# Patient Record
Sex: FEMALE | Race: WHITE | URBAN - METROPOLITAN AREA
[De-identification: names, ages, dates, MRNs, and addresses within clinical notes are randomized per-mention and may not be internally consistent; named-entity substitution may affect disease eponyms.]

---

## 2017-09-27 ENCOUNTER — HOSPITAL ENCOUNTER (OUTPATIENT)
Dept: GENERAL RADIOLOGY | Age: 32
Discharge: HOME | End: 2017-09-27
Attending: OBSTETRICS & GYNECOLOGY
Payer: OTHER GOVERNMENT

## 2017-09-27 DIAGNOSIS — Z31.41 FERTILITY TESTING: ICD-10-CM

## 2017-09-27 PROCEDURE — 58340 CATHETER FOR HYSTEROGRAPHY: CPT

## 2017-09-27 PROCEDURE — 74011636320 HC RX REV CODE- 636/320: Performed by: OBSTETRICS & GYNECOLOGY

## 2017-09-27 RX ADMIN — IOHEXOL 10 ML: 240 INJECTION, SOLUTION INTRATHECAL; INTRAVASCULAR; INTRAVENOUS; ORAL at 13:10

## 2018-07-24 ENCOUNTER — HOSPITAL ENCOUNTER (INPATIENT)
Age: 33
LOS: 3 days | Discharge: HOME | End: 2018-07-27
Attending: OBSTETRICS & GYNECOLOGY | Admitting: OBSTETRICS & GYNECOLOGY
Payer: OTHER GOVERNMENT

## 2018-07-24 PROBLEM — O09.519 ADVANCED MATERNAL AGE, PRIMIGRAVIDA: Status: ACTIVE | Noted: 2018-07-24

## 2018-07-24 PROBLEM — B00.1 HERPES LABIALIS WITHOUT COMPLICATION: Status: ACTIVE | Noted: 2018-07-24

## 2018-07-24 PROBLEM — E03.9 HYPOTHYROIDISM IN PREGNANCY: Status: ACTIVE | Noted: 2018-07-24

## 2018-07-24 PROBLEM — O99.280 HYPOTHYROIDISM IN PREGNANCY: Status: ACTIVE | Noted: 2018-07-24

## 2018-07-24 LAB
A1 MICROGLOB PLACENTAL VAG QL: POSITIVE
ABO + RH BLD: NORMAL
ALBUMIN SERPL-MCNC: 2.7 G/DL (ref 3.4–5)
ALBUMIN/GLOB SERPL: 0.7 {RATIO} (ref 0.8–1.7)
ALP SERPL-CCNC: 151 U/L (ref 45–117)
ALT SERPL-CCNC: 19 U/L (ref 13–56)
ANION GAP SERPL CALC-SCNC: 7 MMOL/L (ref 3–18)
AST SERPL-CCNC: 18 U/L (ref 15–37)
BASOPHILS # BLD: 0 K/UL (ref 0–0.1)
BASOPHILS NFR BLD: 0 % (ref 0–2)
BILIRUB SERPL-MCNC: 0.3 MG/DL (ref 0.2–1)
BLOOD GROUP ANTIBODIES SERPL: NORMAL
BUN SERPL-MCNC: 9 MG/DL (ref 7–18)
BUN/CREAT SERPL: 11 (ref 12–20)
CALCIUM SERPL-MCNC: 9 MG/DL (ref 8.5–10.1)
CHLORIDE SERPL-SCNC: 106 MMOL/L (ref 100–108)
CO2 SERPL-SCNC: 24 MMOL/L (ref 21–32)
CONTROL LINE PRESENT?: NORMAL
CREAT SERPL-MCNC: 0.81 MG/DL (ref 0.6–1.3)
DIFFERENTIAL METHOD BLD: ABNORMAL
EOSINOPHIL # BLD: 0.1 K/UL (ref 0–0.4)
EOSINOPHIL NFR BLD: 1 % (ref 0–5)
ERYTHROCYTE [DISTWIDTH] IN BLOOD BY AUTOMATED COUNT: 12.7 % (ref 11.6–14.5)
ERYTHROCYTE [DISTWIDTH] IN BLOOD BY AUTOMATED COUNT: 12.9 % (ref 11.6–14.5)
EXPIRATION DATE: NORMAL
GLOBULIN SER CALC-MCNC: 3.9 G/DL (ref 2–4)
GLUCOSE SERPL-MCNC: 94 MG/DL (ref 74–99)
HCT VFR BLD AUTO: 33.5 % (ref 35–45)
HCT VFR BLD AUTO: 35.6 % (ref 35–45)
HGB BLD-MCNC: 11.8 G/DL (ref 12–16)
HGB BLD-MCNC: 12.4 G/DL (ref 12–16)
INTERNAL NEGATIVE CONTROL: NORMAL
KIT LOT NO.: NORMAL
LYMPHOCYTES # BLD: 1.3 K/UL (ref 0.9–3.6)
LYMPHOCYTES NFR BLD: 12 % (ref 21–52)
MCH RBC QN AUTO: 32.7 PG (ref 24–34)
MCH RBC QN AUTO: 32.7 PG (ref 24–34)
MCHC RBC AUTO-ENTMCNC: 34.8 G/DL (ref 31–37)
MCHC RBC AUTO-ENTMCNC: 35.2 G/DL (ref 31–37)
MCV RBC AUTO: 92.8 FL (ref 74–97)
MCV RBC AUTO: 93.9 FL (ref 74–97)
MONOCYTES # BLD: 0.8 K/UL (ref 0.05–1.2)
MONOCYTES NFR BLD: 8 % (ref 3–10)
NEUTS SEG # BLD: 8.5 K/UL (ref 1.8–8)
NEUTS SEG NFR BLD: 79 % (ref 40–73)
PLATELET # BLD AUTO: 216 K/UL (ref 135–420)
PLATELET # BLD AUTO: 228 K/UL (ref 135–420)
PMV BLD AUTO: 10.6 FL (ref 9.2–11.8)
PMV BLD AUTO: 10.6 FL (ref 9.2–11.8)
POTASSIUM SERPL-SCNC: 3.6 MMOL/L (ref 3.5–5.5)
PROT SERPL-MCNC: 6.6 G/DL (ref 6.4–8.2)
RBC # BLD AUTO: 3.61 M/UL (ref 4.2–5.3)
RBC # BLD AUTO: 3.79 M/UL (ref 4.2–5.3)
SODIUM SERPL-SCNC: 137 MMOL/L (ref 136–145)
SPECIMEN EXP DATE BLD: NORMAL
URATE SERPL-MCNC: 3.9 MG/DL (ref 2.6–7.2)
WBC # BLD AUTO: 10.7 K/UL (ref 4.6–13.2)
WBC # BLD AUTO: 12.6 K/UL (ref 4.6–13.2)

## 2018-07-24 PROCEDURE — 85025 COMPLETE CBC W/AUTO DIFF WBC: CPT | Performed by: OBSTETRICS & GYNECOLOGY

## 2018-07-24 PROCEDURE — 74011250636 HC RX REV CODE- 250/636: Performed by: OBSTETRICS & GYNECOLOGY

## 2018-07-24 PROCEDURE — 86900 BLOOD TYPING SEROLOGIC ABO: CPT | Performed by: OBSTETRICS & GYNECOLOGY

## 2018-07-24 PROCEDURE — 36415 COLL VENOUS BLD VENIPUNCTURE: CPT | Performed by: ADVANCED PRACTICE MIDWIFE

## 2018-07-24 PROCEDURE — 65270000029 HC RM PRIVATE

## 2018-07-24 PROCEDURE — 80053 COMPREHEN METABOLIC PANEL: CPT | Performed by: ADVANCED PRACTICE MIDWIFE

## 2018-07-24 PROCEDURE — 74011250636 HC RX REV CODE- 250/636

## 2018-07-24 PROCEDURE — 84550 ASSAY OF BLOOD/URIC ACID: CPT | Performed by: ADVANCED PRACTICE MIDWIFE

## 2018-07-24 PROCEDURE — 85027 COMPLETE CBC AUTOMATED: CPT | Performed by: ADVANCED PRACTICE MIDWIFE

## 2018-07-24 PROCEDURE — 83615 LACTATE (LD) (LDH) ENZYME: CPT | Performed by: ADVANCED PRACTICE MIDWIFE

## 2018-07-24 PROCEDURE — 84112 EVAL AMNIOTIC FLUID PROTEIN: CPT | Performed by: OBSTETRICS & GYNECOLOGY

## 2018-07-24 RX ORDER — LIDOCAINE HYDROCHLORIDE 10 MG/ML
20 INJECTION, SOLUTION EPIDURAL; INFILTRATION; INTRACAUDAL; PERINEURAL AS NEEDED
Status: DISCONTINUED | OUTPATIENT
Start: 2018-07-24 | End: 2018-07-24

## 2018-07-24 RX ORDER — MAG HYDROX/ALUMINUM HYD/SIMETH 200-200-20
15 SUSPENSION, ORAL (FINAL DOSE FORM) ORAL
Status: DISCONTINUED | OUTPATIENT
Start: 2018-07-24 | End: 2018-07-25

## 2018-07-24 RX ORDER — OXYTOCIN/RINGER'S LACTATE 20/1000 ML
500 PLASTIC BAG, INJECTION (ML) INTRAVENOUS ONCE
Status: DISCONTINUED | OUTPATIENT
Start: 2018-07-24 | End: 2018-07-24

## 2018-07-24 RX ORDER — METHYLERGONOVINE MALEATE 0.2 MG/ML
0.2 INJECTION INTRAVENOUS AS NEEDED
Status: DISCONTINUED | OUTPATIENT
Start: 2018-07-24 | End: 2018-07-25

## 2018-07-24 RX ORDER — TERBUTALINE SULFATE 1 MG/ML
0.25 INJECTION SUBCUTANEOUS
Status: DISCONTINUED | OUTPATIENT
Start: 2018-07-24 | End: 2018-07-24

## 2018-07-24 RX ORDER — NALBUPHINE HYDROCHLORIDE 10 MG/ML
10 INJECTION, SOLUTION INTRAMUSCULAR; INTRAVENOUS; SUBCUTANEOUS
Status: DISCONTINUED | OUTPATIENT
Start: 2018-07-24 | End: 2018-07-24

## 2018-07-24 RX ORDER — SODIUM CHLORIDE, SODIUM LACTATE, POTASSIUM CHLORIDE, CALCIUM CHLORIDE 600; 310; 30; 20 MG/100ML; MG/100ML; MG/100ML; MG/100ML
125 INJECTION, SOLUTION INTRAVENOUS CONTINUOUS
Status: DISCONTINUED | OUTPATIENT
Start: 2018-07-24 | End: 2018-07-25

## 2018-07-24 RX ORDER — MISOPROSTOL 200 UG/1
800 TABLET ORAL
Status: DISPENSED | OUTPATIENT
Start: 2018-07-24 | End: 2018-07-25

## 2018-07-24 RX ORDER — HYDROMORPHONE HYDROCHLORIDE 1 MG/ML
1 INJECTION, SOLUTION INTRAMUSCULAR; INTRAVENOUS; SUBCUTANEOUS
Status: DISCONTINUED | OUTPATIENT
Start: 2018-07-24 | End: 2018-07-25

## 2018-07-24 RX ORDER — SODIUM CHLORIDE, SODIUM LACTATE, POTASSIUM CHLORIDE, CALCIUM CHLORIDE 600; 310; 30; 20 MG/100ML; MG/100ML; MG/100ML; MG/100ML
125 INJECTION, SOLUTION INTRAVENOUS CONTINUOUS
Status: DISCONTINUED | OUTPATIENT
Start: 2018-07-24 | End: 2018-07-24

## 2018-07-24 RX ORDER — BUTORPHANOL TARTRATE 1 MG/ML
2 INJECTION INTRAMUSCULAR; INTRAVENOUS
Status: DISCONTINUED | OUTPATIENT
Start: 2018-07-24 | End: 2018-07-24

## 2018-07-24 RX ORDER — OXYTOCIN IN 5 % DEXTROSE 30/500 ML
2 PLASTIC BAG, INJECTION (ML) INTRAVENOUS
Status: DISCONTINUED | OUTPATIENT
Start: 2018-07-24 | End: 2018-07-25

## 2018-07-24 RX ORDER — LIDOCAINE HYDROCHLORIDE 10 MG/ML
30 INJECTION, SOLUTION EPIDURAL; INFILTRATION; INTRACAUDAL; PERINEURAL AS NEEDED
Status: DISCONTINUED | OUTPATIENT
Start: 2018-07-24 | End: 2018-07-25

## 2018-07-24 RX ORDER — OXYTOCIN IN 5 % DEXTROSE 30/500 ML
PLASTIC BAG, INJECTION (ML) INTRAVENOUS
Status: COMPLETED
Start: 2018-07-24 | End: 2018-07-24

## 2018-07-24 RX ORDER — NALBUPHINE HYDROCHLORIDE 10 MG/ML
10 INJECTION, SOLUTION INTRAMUSCULAR; INTRAVENOUS; SUBCUTANEOUS
Status: DISCONTINUED | OUTPATIENT
Start: 2018-07-24 | End: 2018-07-25

## 2018-07-24 RX ORDER — HYDROMORPHONE HYDROCHLORIDE 1 MG/ML
1 INJECTION, SOLUTION INTRAMUSCULAR; INTRAVENOUS; SUBCUTANEOUS
Status: DISCONTINUED | OUTPATIENT
Start: 2018-07-24 | End: 2018-07-24

## 2018-07-24 RX ORDER — OXYTOCIN/RINGER'S LACTATE 20/1000 ML
125 PLASTIC BAG, INJECTION (ML) INTRAVENOUS CONTINUOUS
Status: DISCONTINUED | OUTPATIENT
Start: 2018-07-24 | End: 2018-07-24

## 2018-07-24 RX ORDER — LEVOTHYROXINE SODIUM 88 UG/1
88 TABLET ORAL
Status: DISCONTINUED | OUTPATIENT
Start: 2018-07-25 | End: 2018-07-27 | Stop reason: HOSPADM

## 2018-07-24 RX ORDER — OXYTOCIN/RINGER'S LACTATE 20/1000 ML
125 PLASTIC BAG, INJECTION (ML) INTRAVENOUS CONTINUOUS
Status: DISCONTINUED | OUTPATIENT
Start: 2018-07-24 | End: 2018-07-25

## 2018-07-24 RX ORDER — LEVOTHYROXINE SODIUM 88 UG/1
88 TABLET ORAL
COMMUNITY

## 2018-07-24 RX ORDER — ONDANSETRON 2 MG/ML
4 INJECTION INTRAMUSCULAR; INTRAVENOUS
Status: DISCONTINUED | OUTPATIENT
Start: 2018-07-24 | End: 2018-07-25

## 2018-07-24 RX ORDER — OXYTOCIN/RINGER'S LACTATE 20/1000 ML
500 PLASTIC BAG, INJECTION (ML) INTRAVENOUS ONCE
Status: DISPENSED | OUTPATIENT
Start: 2018-07-24 | End: 2018-07-25

## 2018-07-24 RX ORDER — METHYLERGONOVINE MALEATE 0.2 MG/ML
0.2 INJECTION INTRAVENOUS AS NEEDED
Status: DISCONTINUED | OUTPATIENT
Start: 2018-07-24 | End: 2018-07-24

## 2018-07-24 RX ORDER — CARBOPROST TROMETHAMINE 250 UG/ML
250 INJECTION, SOLUTION INTRAMUSCULAR
Status: ACTIVE | OUTPATIENT
Start: 2018-07-24 | End: 2018-07-25

## 2018-07-24 RX ORDER — OXYTOCIN 10 [USP'U]/ML
10 INJECTION, SOLUTION INTRAMUSCULAR; INTRAVENOUS
Status: ACTIVE | OUTPATIENT
Start: 2018-07-24 | End: 2018-07-25

## 2018-07-24 RX ORDER — SALICYLIC ACID
30 POWDER (GRAM) MISCELLANEOUS ONCE
Status: DISCONTINUED | OUTPATIENT
Start: 2018-07-24 | End: 2018-07-24

## 2018-07-24 RX ORDER — MISOPROSTOL 200 UG/1
800 TABLET ORAL ONCE
Status: DISCONTINUED | OUTPATIENT
Start: 2018-07-24 | End: 2018-07-24

## 2018-07-24 RX ORDER — TERBUTALINE SULFATE 1 MG/ML
0.25 INJECTION SUBCUTANEOUS
Status: DISCONTINUED | OUTPATIENT
Start: 2018-07-24 | End: 2018-07-25

## 2018-07-24 RX ADMIN — Medication 4 MILLI-UNITS/MIN: at 16:28

## 2018-07-24 RX ADMIN — Medication 2 MILLI-UNITS/MIN: at 18:42

## 2018-07-24 RX ADMIN — Medication 6 MILLI-UNITS/MIN: at 17:22

## 2018-07-24 RX ADMIN — Medication 1 MILLI-UNITS: at 13:58

## 2018-07-24 RX ADMIN — SODIUM CHLORIDE, SODIUM LACTATE, POTASSIUM CHLORIDE, AND CALCIUM CHLORIDE 125 ML/HR: 600; 310; 30; 20 INJECTION, SOLUTION INTRAVENOUS at 22:00

## 2018-07-24 NOTE — PROGRESS NOTES
Contrx q 2-5, lasting 60 sec. FHT reactive. Afebrile. Pt comfortable sitting on birth ball. Pitocin @4, plan to increase pitocin infusion per protocol until effective contrx achieved. Pt and spouse in agreement with plan.

## 2018-07-24 NOTE — PROGRESS NOTES
SVE 1/50/-2, unchanged from this morning. Irreg contrx, q 3-10 mins. Reactive FHR. Discussed r/b/a of oxytocin for labor augmentation. Stephenie Nava is in agreement to have PIV started and pitocin. She desires unmedicated labor/birth and would like to not be offered pain mgmt.

## 2018-07-24 NOTE — PROGRESS NOTES
0854- 40 wks presented to L&D w/ c/o \"leaking fluid\" since  at 0700. Pt reports seeing clear fluid w/ some mucus and some blood. Pt denies sig uc's. Pt reports active fetal movement. RR-18 even and unlabored. 0955-Ashli RAMÍREZ at bedside. SVE explained and done now. SVE /2. POC reviewed w/ pt now for recheck at 1200. Pt requesting to ambulate in hallway. Orders received for pt to ambulate in hallway. 1015-Pt up to ambulate in hallway. 1210-Pt returned to rm [phone]. S/O at side. No distress noted. Pt states \"having some uc's w/ movement\". 1225-Wireless EFM requested by pt and placed and functioning     1505-RN at bedside. Pt sitting up on birthing ball at bedside. 1655-Ashli RAMÍREZ on unit. Orders received to continue increasing pitocin every 30 min until adequate uc's pattern. 1835-Ashli RAMÍREZ called and report given on pt breathing w/ uc's, pitocin at 8 mu/min and pt requesting to get in shower. Orders received to get pt in shower.

## 2018-07-24 NOTE — H&P
History & Physical    Name: Ashwin Brandt MRN: [id number]  SSN: [id number]    YOB: 1985  Age: 27 y.o. Sex: female        Subjective:     Ashwin Brandt   Estimated Date of Delivery: 18  OB History      Para Term  AB Living    1         SAB TAB Ectopic Molar Multiple Live Births                   Ms. Brendalyn Lesch is admitted with pregnancy at 40w0d for PROM. Prenatal course was normal. Prenatal care has been followed by [facility]. Please see prenatal records for details. Admitted to . No past medical history on file. No past surgical history on file. Social History     Occupational History    Not on file. Social History Main Topics    Smoking status: Not on file    Smokeless tobacco: Not on file    Alcohol use Not on file    Drug use: Not on file    Sexual activity: Not on file     No family history on file. Not on File  Prior to Admission medications    Medication Sig Start Date End Date Taking? Authorizing Provider   levothyroxine (SYNTHROID) 88 mcg tablet Take 88 mcg by mouth Daily (before breakfast). Yes Historical Provider        Review of Systems: See prenatal record    Objective:     Vitals:  Vitals:    18 0902   BP: 129/79   Pulse: 77   Resp: 18   Temp: 96 °F (35.6 °C)        Physical Exam:  Heart: Regular rate and rhythm or S1S2 present  Lung: clear to auscultation throughout lung fields, no wheezes, no rales, no rhonchi and normal respiratory effort  Abdomen: soft, nontender  Fundus: soft and non tender  Perineum: blood absent, amniotic fluid absent, lesions absent  Cervical Exam: 1 cm dilated    50% effaced    -2 station    P[place] Score: 6  Membranes:  Premature Rupture of Membranes; Amniotic Fluid: clear fluid  Fetal Heart Rate: Reactive    Prenatal Labs:   No results found for: RUBELLAEXT, GRBSEXT, HBSAGEXT, HIVEXT, RPREXT, GONNOEXT, CHLAMEXT    Group B Strep was negative.     Assessment/Plan:   Assessment: IUP @ 40w0d    Plan: Admit for premature rupture of membranes, reassuring maternal and fetal status. Expectant management, reassess within 18hrs of ROM, and consider augmentation if not in active labor. Risks, benefits, and alternatives discussed with pt and spouse, in agreement with plan.     Signed By:  Jessica Nur CNM     July 24, 2018

## 2018-07-24 NOTE — IP AVS SNAPSHOT
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 [facility] [phone] [facility] [phone] 
[phone] Discharge Orders None A check mallika indicates which time of day the medication should be taken. My Medications START taking these medications Instructions Each Dose to Equal  
 Morning Noon Evening Bedtime  
 ibuprofen 800 mg tablet Commonly known as:  MOTRIN Your last dose was: Your next dose is: Take 1 Tab by mouth every eight (8) hours as needed. 800 mg CONTINUE taking these medications Instructions Each Dose to Equal  
 Morning Noon Evening Bedtime  
 levothyroxine 88 mcg tablet Commonly known as:  SYNTHROID Your last dose was: Your next dose is: Take 88 mcg by mouth Daily (before breakfast). 88 mcg Where to Get Your Medications Information on where to get these meds will be given to you by the nurse or doctor. ! Ask your nurse or doctor about these medications  
  ibuprofen 800 mg tablet Discharge Instructions CONGRATULATIONS ON THE BIRTH OF YOUR BABY! The first six weeks after childbirth is a time of physical and emotional adjustment. This handout will help to answer questions and provide guidance during the postpartum period. Every family's adjustment is unique, so please call if you have further concerns. At anytime we can be reached at [phone]. During office hours please ask to speak to a charge nurse. After hours, the answering service will take a message and the Nurse-Midwife on-call will return your call. If your question can wait until office hours: Monday-Friday 8:30-4:00, please do so. For emergencies or urgent concerns do not hesitate to call us after hours. DIET Your body is in need of a well-balanced, high protein diet to recuperate from birth. Please continue to take your prenatal vitamins for 6 weeks or as long as you are breastfeeding. Continue to drink at least 6-8 cups of water or other liquid a day. A breastfeeding mother also needs extra protein, calories and calcium containing foods. It is a good rule to drink fluids with every feeding in order to maintain an adequate milk supply and avoid dehydration. Your baby will probably not be bothered by things in your diet, but if the baby seems extremely fussy or develops a rash, you may want to discuss possible food intolerances with your baby's care provider. PAIN MEDICATIONS Acetaminophen (Tylenol), ibuprofen (Motrin), or other prescribed pain medication may be taken as directed to relieve discomfort. The above medications pass in very minimal amounts into the breast milk and usually will not cause problems. There are medications that may affect the baby, so please consult your baby's care provider before taking medication.   If you are breastfeeding, be sure to mention this to any care provider you see so that medications that are safe may be selected. There is an excellent resource called Starbelly.com that is a resource for medication safety in pregnancy and lactation. You can visit their website at Exakis/ or call them toll free at [phone] if you have any questions about medication safety. UTERINE INVOLUTION / VAGINAL BLEEDING Involution is the process of the uterus returning to pre-pregnant size. It will take approximately six weeks for this process to occur. To achieve this size your uterus becomes firm to slow bleeding loss from the placental site. The first 7 days after birth, the bleeding is red and heavy. It may change with your activity and position. Some small clots are normal.   After ten days, the bleeding should be pale pink and slowed considerably. The next several weeks may progress to a pink, mucousy discharge. This may continue for 6-8 weeks, depending on your activity. During the first four weeks after delivery we recommend using sanitary pads instead of tampons. Douching should also be avoided, but it is fine to take a tub bath so long as the tub is very clean. ACTIVITY/EXERCISE Adequate rest is essential to recovery. Try to rest or sleep when the baby sleeps. After two weeks, you may begin going for short walks, doing Kegel exercises and abdominal crunches. Avoid heavy, jarring or aerobic exercises. Remember to start out slowly and build up to your previous fitness level. Use common sense and don't overdo as rest is important and the benefits of increased rest are a quicker recovery. For the first two weeks after a  try to limit trips up or down steps. Do not lift anything heavier than the baby during this time.   Lifting the baby or other objects should be done by bending at the knees rather than the waist.  Driving should be avoided during the first two to three weeks until you have the strength to push firmly on the brakes in case of an emergency. You may ride as a passenger, but DO wear a seat belt at all times. After a few weeks, you may resume normal activity at whatever pace is comfortable for you. Exercise may also be resumed gradually. Walking is a good way to start. Finally, try to be reasonable in your expectations. Caring for a new baby after major surgery can be quite trying. Arrange for assistance at home to ensure that you get enough rest.  
 
POSTPARTUM CHECK You may call the office when you return home to set up a postpartum visit. Most patients will be seen at 6 weeks after delivery, but after a  or other circumstances you may be seen in 2 weeks or less. If you are discharged from the hospital with staples that must be removed, you will be asked to come in sooner. At your postpartum visit, a pelvic exam may be performed. If you are having any problems or concerns, please do not hesitate to call. Once again our number is [phone]. MOOD CHANGES Significant hormonal changes occur in the days following delivery, and as a result, many women experience brief episodes of tearfulness or feeling \"blue. \"  These emotional swings may be made worse by lack of sleep and by the adjustments inherent in becoming a mother. For some women, these fluctuations are minor. For others, they are overwhelming; creating feelings of anxiety, depression, or the inability to cope. If you have difficulty functioning as a result of feeling down, or if the mood changes seem severe, do not improve, or result is thoughts of harming yourself or others CALL RIGHT AWAY. PERINEAL CARE The basic goals of perineal care are to prevent infection, to relieve pain and promote healing. Your stitches will dissolve in four to six weeks, and do not need to be removed. After urinating, please continue to clean with warm water from front to back.   Please continue sitz baths as instructed twice a day for a week or as needed. Call the office if you see pus in the suture site, or have unusual or severe swelling or pain that seems to be getting worse. INCISION CARE If you had a , clean and dry the incision gently as you would the rest of your body. Washing over the area with soap and water, and showering are fine. If steri-strips are present they will gradually come off with time. Tub baths are permitted. You may experience numbness and burning in the area surrounding the incision which usually resolves gradually over the next several weeks or months. RETURN OF MENSTRUATION Your first menstrual period may occur as soon as four to six weeks after your delivery if you are not breast-feeding. If breast-feeding it is more difficult to predict when your first period will occur. Even if you are not yet menstruating, you may be ovulating and it may be possible to conceive again. It is common for your first period after childbirth to be very heavy with an increased amount of cramping. BREASTS Breast-feeding Mothers: Colostrum is excreted in the first 24-72 hours. Mature breast milk will appear on the 2nd to 5th day. Engorgement may occur with the mature milk making your breasts feel warm and very full. Frequent feedings will make you more comfortable. Babies do not nurse on regular schedules. Nursing every 1 1/2 to 2 hours is normal and frequent feeding DOES NOT mean you are not making enough milk. To avoid nipple confusion, do not give bottles for the first 4 weeks. Growth spurts are common and may require more frequent feedings. This is the way baby increases your milk supply. During a growth spurt, you may feel you are feeding very frequently and that your breasts are \"empty. \"  Don't worry, your milk is produced by supply and demand so this increased frequency of feeding will increase your milk supply within 48 hours.   Sore nipples may occur with frequent feedings and are sometimes also caused by improper latch. Check for a proper latch. Baby should have a wide open mouth. Use different positions at each feeding if possible. Express a small amount of colostrum or breast milk onto the sore area and leave bra flaps unlatched until dry. The lactation consultant at [facility] is available for outpatient consultation without charge. Call [phone] from Monday-Friday 9:00am- 3:00pm to arrange an outpatient appointment with her. Local [facility] and consultants may also be very helpful. If You Are Not Breast-feeding: You will experience swelling, engorgement and some milk production. There are no safe medications available to stop lactation. Some remedies for engorgement include: wearing a tight bra, ice packs and cold green cabbage leaves placed between the breast and your bra. Change these frequently. Tylenol or Motrin should help with the discomfort. SEXUAL ADJUSTMENTS We recommend that you wait at least four weeks before resuming sexual intercourse. A sore perineum, a demanding baby and fatigue will certainly affect your ability to enjoy lovemaking! A vaginal lubricant is recommended to help with any dryness. It is very important to remember that you will ovulate BEFORE your first period and can conceive. If you do not wish another pregnancy right away, please take precautions to avoid pregnancy. If you would like a prescription method of birth control, please discuss this with us at your 6 week visit. ELIMINATION We remind all postpartum patients that it may take a few days for your bowels to return to normal, especially if you had a long labor. For those who had C-sections or severe lacerations, we recommend that you use a stool softener twice daily for at least two weeks. Many stool softeners are over-the-counter. Colace (Docusate Sodium) is recommended.   Bulk forming agents such as Metamucil or Olene Pardon may be used daily in addition to a stool softener to promote regular bowel movements. Eating fresh fruits and vegetables along with whole grains is helpful as well. Do not be afraid to have a bowel movement as your stitches will not \"come out\" in the course of having a bowel movement. Urination may be difficult due to soreness around the urethra, or as an after effect of epidural.  This is temporary and can be helped  by squirting water over the perineum or try going in the shower. Hemorrhoids are common after birth. Tucks pads, Anusol cream and avoiding constipation are helpful. If constipation does occur, you may take Milk of Magnesia or Senekot according to the package instructions. DANGER SIGNS! CALL WITHOUT DELAY IF YOU ARE EXPERIENCING ANY OF THE FOLLOWING: 
* Unusually heavy bleeding, soaking more than 1 or more pads in an hour. * Vaginal discharge with strong foul odor. * Fever of 101 or higher * Unusual pain or tenderness in the abdominal area. * If breasts are red, hot or have a painful lump. * Depression that persists longer than 1-2 weeks or is severe. * Any urinary frequency accompanied by urgency or pain. * A lump in leg or calf especially if painful, warm or red. We thank you for choosing us for your prenatal care and/or delivery. We wish you all happiness and health with your baby for his or her lifetime! Introducing [facility]! Romayne Duster introduces Wiz Maps patient portal. Now you can access parts of your medical record, email your doctor's office, and request medication refills online. 1. In your internet browser, go to [email or web address]. [email or web address] Step [email or web address] 2. Click on the First Time User? Click Here link in the Sign In box. You will see the New Member Sign Up page. 3. Enter your Wiz Maps Access Code exactly as it appears below.  You will not need to use this code after youve completed the sign-up process. If you do not sign up before the expiration date, you must request a new code. · Duo Security Access Code: [email or web address] Expires: 10/25/2018  9:16 AM 
 
4. Enter the last four digits of your Social Security Number (xxxx) and Date of Birth (mm/dd/yyyy) as indicated and click Submit. You will be taken to the next sign-up page. 5. Create a Duo Security ID. This will be your Duo Security login ID and cannot be changed, so think of one that is secure and easy to remember. 6. Create a Duo Security password. You can change your password at any time. 7. Enter your Password Reset Question and Answer. This can be used at a later time if you forget your password. 8. Enter your e-mail address. You will receive e-mail notification when new information is available in [address] 9. Click Sign Up. You can now view and download portions of your medical record. 10. Click the Download Summary menu link to download a portable copy of your medical information. If you have questions, please visit the Frequently Asked Questions section of the Duo Security website. Remember, Duo Security is NOT to be used for urgent needs. For medical emergencies, dial 911. Now available from your iPhone and Android! Introducing Ildefonso Cisneros As a [facility] patient, I wanted to make you aware of our electronic visit tool called Ildefonso Cisneros. [facility] 24/7 allows you to connect within minutes with a medical provider 24 hours a day, seven days a week via a mobile device or tablet or logging into a secure website from your computer. You can access Ildefonso Cisneros from anywhere in the [place].  
 
A virtual visit might be right for you when you have a simple condition and feel like you just dont want to get out of bed, or cant get away from work for an appointment, when your regular [facility] provider is not available (evenings, weekends or holidays), or when youre out of town and need minor care. Electronic visits cost only $49 and if the Larios Pearson Smart Mocha 24/Dryad provider determines a prescription is needed to treat your condition, one can be electronically transmitted to a nearby pharmacy*. Please take a moment to enroll today if you have not already done so. The enrollment process is free and takes just a few minutes. To enroll, please download the Datacastle alfie to your tablet or phone, or visit [email or web address] to enroll on your computer. And, as an [address] [place] [address] patient with a [email or web address] account, the results of your visits will be scanned into your electronic medical record and your primary care provider will be able to view the scanned results. We urge you to continue to see your regular [facility] provider for your ongoing medical care. And while your primary care provider may not be the one available when you seek a Yazino virtual visit, the peace of mind you get from getting a real diagnosis real time can be priceless. For more information on Yazino, view our Frequently Asked Questions (FAQs) at [email or web address]. Sincerely, 
 
Francisco Ren MD 
Chief Medical Officer [facility] Lianet Gregory *:  certain medications cannot be prescribed via Yazino Providers Seen During Your Hospitalization Provider Specialty Primary office phone Mitchell Schuster MD Obstetrics & Gynecology [phone] Your Primary Care Physician (PCP) Primary Care Physician Office Phone Office Fax OTHER, PHYS ** None ** ** None ** You are allergic to the following No active allergies Recent Documentation Breastfeeding? OB Status Unknown Recent pregnancy Emergency Contacts Name Discharge Info Relation Home Work Mobile  Lui Dueñas DISCHARGE CAREGIVER [3] Spouse [3]  [phone] Patient Belongings The following personal items are in your possession at time of discharge: 
  Dental Appliances: None  Visual Aid: None      Home Medications: Sent home   Jewelry: None  Clothing: At bedside, Dress Discharge Instructions Attachments/References DEPRESSION: POSTPARTUM (ENGLISH) BREASTFEEDING (ENGLISH) Patient Handouts Depression After Childbirth: Care Instructions Your Care Instructions Many women get the \"baby blues\" during the first few days after childbirth. You may lose sleep, feel irritable, and cry easily. You may feel happy one minute and sad the next. Hormone changes are one cause of these emotional changes. Also, the demands of a new baby, along with visits from relatives or other family needs, add to a mother's stress. The \"baby blues\" often peak around the fourth day. Then they ease up in less than 2 weeks. If your moodiness or anxiety lasts for more than 2 weeks, or if you feel like life is not worth living, you may have postpartum depression. This is different for each mother. Some mothers with serious depression may worry intensely about their infant's well-being. Others may feel distant from their child. Some mothers might even feel that they might harm their baby. A mother may have signs of paranoia, wondering if someone is watching her. Depression is not a sign of weakness. It is a medical condition that requires treatment. Medicine and counseling often work well to reduce depression. Talk to your doctor about taking antidepressant medicine while breastfeeding. Follow-up care is a key part of your treatment and safety. Be sure to make and go to all appointments, and call your doctor if you are having problems. It's also a good idea to know your test results and keep a list of the medicines you take. How do you know if you are depressed? With all the changes in your life, you may not know if you are depressed. Pregnancy sometimes causes changes in how you feel that are similar to the symptoms of depression. Symptoms of depression include: · Feeling sad or hopeless and losing interest in daily activities. These are the most common symptoms of depression. · Sleeping too much or not enough. · Feeling tired. You may feel as if you have no energy. · Eating too much or too little. · Writing or talking about death, such as writing suicide notes or talking about guns, knives, or pills. Keep the numbers for these national suicide hotlines: 5-459-329-TALK (8-470.899.2364) and 3-447-QNWUEZV (8-319.998.8662). If you or someone you know talks about suicide or feeling hopeless, get help right away. How can you care for yourself at home? · Be safe with medicines. Take your medicines exactly as prescribed. Call your doctor if you think you are having a problem with your medicine. · Eat a healthy diet so that you can keep up your energy. · Get regular daily exercise, such as walks, to help improve your mood. · Get as much sunlight as possible. Keep your shades and curtains open. Get outside as much as you can. · Avoid using alcohol or other substances to feel better. · Get as much rest and sleep as possible. Avoid doing too much. Being too tired can increase depression. · Play stimulating music throughout your day and soothing music at night. · Schedule outings and visits with friends and family. Ask them to call you regularly, so that you do not feel alone. · Ask for help with preparing food and other daily tasks. Family and friends are often happy to help a mother with a . · Be honest with yourself and those who care about you. Tell them about your struggle. · Join a support group of new mothers. No one can better understand the challenges of caring for a  than other new mothers.  
· If you feel like life is not worth living or are feeling hopeless, get help right away. Keep the numbers for these national suicide hotlines: 4-978-554-TALK (1-576.790.5951) and 5-557-SPXURWM (0-547.454.1461). When should you call for help? Call 911 anytime you think you may need emergency care. For example, call if: 
  · You feel you cannot stop from hurting yourself, your baby, or someone else.  
[facility] your doctor now or seek immediate medical care if: 
  · You are having trouble caring for yourself or your baby.  
  · You hear voices.  
Jose Sands closely for changes in your health, and be sure to contact your doctor if: 
  · You have problems with your depression medicine.  
  · You do not get better as expected. Where can you learn more? Go to http://perez-opal.info/. Enter I749 in the search box to learn more about \"Depression After Childbirth: Care Instructions. \" Current as of: December 7, 2017 Content Version: 11.7 © 2234-8734 Sciona. Care instructions adapted under license by [employer] (which disclaims liability or warranty for this information). If you have questions about a medical condition or this instruction, always ask your healthcare professional. [address] any warranty or liability for your use of this information. Breastfeeding: Care Instructions Your Care Instructions Breastfeeding has many benefits. It may lower your baby's chances of getting an infection. It also may prevent your baby from having problems such as diabetes and high cholesterol later in life. Breastfeeding also helps you bond with your baby. The American Academy of Pediatrics recommends breastfeeding for at least a year. That may be very hard for many women to do, but breastfeeding even for a shorter period of time is a health benefit to you and your baby. In the first days after birth, your breasts make a thick, yellow liquid called colostrum.  This liquid gives your baby nutrients and antibodies against infection. It is all that babies need in the first days after birth. Your breasts will fill with milk a few days after the birth. Breastfeeding is a skill that gets better with practice. It is common to have some problems. Some women have sore or cracked nipples, blocked milk ducts, or a breast infection (mastitis). You can treat these problems if they happen and continue breastfeeding. Follow-up care is a key part of your treatment and safety. Be sure to make and go to all appointments, and call your doctor if you are having problems. It's also a good idea to know your test results and keep a list of the medicines you take. How can you care for yourself at home? · Breastfeed your baby whenever he or she is hungry. In the first 2 weeks, your baby will feed about every 1 to 3 hours. This will help you keep up your supply of milk. · Put a bed pillow or a nursing pillow on your lap to support your arms and your baby. · Hold your baby in a comfortable position. ¨ You can hold your baby in several ways. One of the most common positions is the cradle hold. One arm supports your baby, with his or her head in the bend of your elbow. Your open hand supports your baby's bottom or back. Your baby's belly lies against yours. ¨ If you had your baby by , or , try the football hold. This position keeps your baby off your belly. Tuck your baby under your arm, with his or her body along the side you will be feeding on. Support your baby's upper body with your arm. With that hand you can control your baby's head to bring his or her mouth to your breast. 
¨ Try different positions with each feeding. If you are having problems, ask for help from your doctor or a lactation consultant. · To get your baby to latch on: 
¨ Support and narrow your breast with one hand using a \"U hold,\" with your thumb on the outer side of your breast and your fingers on the inner side. You can also use a \"C hold,\" with all your fingers below the nipple and your thumb above it. Try the different holds to get the deepest latch for whichever breastfeeding position you use. Your other arm is behind your baby's back, with your hand supporting the base of the baby's head. Position your fingers and thumb to point toward your baby's ears. ¨ You can touch your baby's lower lip with your nipple to get your baby to open his or her mouth. Wait until your baby opens up really wide, like a big yawn. Then be sure to bring the baby quickly to your breast-not your breast to the baby. As you bring your baby toward your breast, use your other hand to support the breast and guide it into his or her mouth. ¨ Both the nipple and a large portion of the darker area around the nipple (areola) should be in the baby's mouth. The baby's lips should be flared outward, not folded in (inverted). ¨ Listen for a regular sucking and swallowing pattern while the baby is feeding. If you cannot see or hear a swallowing pattern, watch the baby's ears, which will wiggle slightly when the baby swallows. If the baby's nose appears to be blocked by your breast, tilt the baby's head back slightly, so just the edge of one nostril is clear for breathing. ¨ When your baby is latched, you can usually remove your hand from supporting your breast and bring it under your baby to cradle him or her. Now just relax and breastfeed your baby. · You will know that your baby is feeding well when: 
¨ His or her mouth covers a lot of the areola, and the lips are flared out. ¨ His or her chin and nose rest against your breast. 
¨ Sucking is deep and rhythmic, with short pauses. ¨ You are able to see and hear your baby swallowing. ¨ You do not feel pain in your nipple.  
· If your baby takes only one breast at a feeding, start the next feeding on the other breast. 
· Anytime you need to remove your baby from the breast, put one finger in the corner of his or her mouth. Push your finger between your baby's gums to gently break the seal. If you do not break the tight seal before you remove your baby, your nipples can become sore, cracked, or bruised. · After feeding your baby, gently pat his or her back to let out any swallowed air. After your baby burps, offer the breast again, or offer the other breast. Sometimes a baby will want to keep feeding after being burped. When should you call for help? Call your doctor now or seek immediate medical care if: 
  · You have symptoms of a breast infection, such as: 
¨ Increased pain, swelling, redness, or warmth around a breast. 
¨ Red streaks extending from the breast. 
¨ Pus draining from a breast. 
¨ A fever.  
  · Your baby has no wet diapers for 6 hours.  
 Watch closely for changes in your health, and be sure to contact your doctor if: 
  · Your baby has trouble latching on to your breast.  
  · You continue to have pain or discomfort when breastfeeding.  
  · You have other questions or concerns. Where can you learn more? Go to http://perez-opal.info/. Enter P492 in the search box to learn more about \"Breastfeeding: Care Instructions. \" Current as of: November 21, 2017 Content Version: 11.7 © 5830-5410 [employer]. Care instructions adapted under license by [employer] (which disclaims liability or warranty for this information). If you have questions about a medical condition or this instruction, always ask your healthcare professional. [place] [address] any warranty or liability for your use of this information. Please provide this summary of care documentation to your next provider. Signatures-by signing, you are acknowledging that this After Visit Summary has been reviewed with you and you have received a copy.   
  
 
  
    
    
 Patient Signature: ____________________________________________________________ Date:  ____________________________________________________________  
  
Santino Naas Provider Signature:  ____________________________________________________________ Date:  ____________________________________________________________

## 2018-07-24 NOTE — PROGRESS NOTES
Labor Progress Note  Lluvia Shirley Managing well in labor. Pitocin @8. Contractions q 1.5-3 mins, lasting 60secs. She is requesting to  shower for comfort. Elevated BPs noted. Pelvic exam:       Cervical Exam  Dilation (cm): 2  Eff: 80 %  Station: -2  Soft, midline. AROM of bulging forebag, clear fluid. Visit Vitals    /85    Pulse 61    Temp 98.3 °F (36.8 °C)    Resp 18       Temp (24hrs), Av.3 °F (36.3 °C), Min:96 °F (35.6 °C), Max:98.3 °F (36.8 °C)      Recent Labs      18   1345   WBC  10.7   HGB  12.4           Assessment/Plan:  Reassuring fetal status. [address] labs for elevated BPs over 4 hours. Continue expectant management of labor and offer support and comfort measures for unmedicated birth.       Marquise Turpin CNM  2018  7:48 PM

## 2018-07-25 ENCOUNTER — ANESTHESIA EVENT (OUTPATIENT)
Dept: LABOR AND DELIVERY | Age: 33
End: 2018-07-25
Payer: OTHER GOVERNMENT

## 2018-07-25 ENCOUNTER — ANESTHESIA (OUTPATIENT)
Dept: LABOR AND DELIVERY | Age: 33
End: 2018-07-25
Payer: OTHER GOVERNMENT

## 2018-07-25 PROBLEM — B00.1 HERPES LABIALIS WITHOUT COMPLICATION: Status: RESOLVED | Noted: 2018-07-24 | Resolved: 2018-07-25

## 2018-07-25 PROBLEM — O09.519 ADVANCED MATERNAL AGE, PRIMIGRAVIDA: Status: RESOLVED | Noted: 2018-07-24 | Resolved: 2018-07-25

## 2018-07-25 LAB — LDH SERPL L TO P-CCNC: 173 U/L (ref 81–234)

## 2018-07-25 PROCEDURE — 75410000002 HC LABOR FEE PER 1 HR

## 2018-07-25 PROCEDURE — 74011250637 HC RX REV CODE- 250/637: Performed by: ADVANCED PRACTICE MIDWIFE

## 2018-07-25 PROCEDURE — 77030034849

## 2018-07-25 PROCEDURE — 76060000078 HC EPIDURAL ANESTHESIA

## 2018-07-25 PROCEDURE — 65270000029 HC RM PRIVATE

## 2018-07-25 PROCEDURE — 3E0R3BZ INTRODUCTION OF ANESTHETIC AGENT INTO SPINAL CANAL, PERCUTANEOUS APPROACH: ICD-10-PCS | Performed by: ANESTHESIOLOGY

## 2018-07-25 PROCEDURE — 75410000000 HC DELIVERY VAGINAL/SINGLE

## 2018-07-25 PROCEDURE — 74011000250 HC RX REV CODE- 250: Performed by: NURSE ANESTHETIST, CERTIFIED REGISTERED

## 2018-07-25 PROCEDURE — 0HQ9XZZ REPAIR PERINEUM SKIN, EXTERNAL APPROACH: ICD-10-PCS | Performed by: OBSTETRICS & GYNECOLOGY

## 2018-07-25 PROCEDURE — 00HU33Z INSERTION OF INFUSION DEVICE INTO SPINAL CANAL, PERCUTANEOUS APPROACH: ICD-10-PCS | Performed by: ANESTHESIOLOGY

## 2018-07-25 PROCEDURE — 77010026065 HC OXYGEN MINIMUM MEDICAL AIR

## 2018-07-25 PROCEDURE — 74011250637 HC RX REV CODE- 250/637

## 2018-07-25 PROCEDURE — 74011250636 HC RX REV CODE- 250/636: Performed by: OBSTETRICS & GYNECOLOGY

## 2018-07-25 PROCEDURE — 74011250636 HC RX REV CODE- 250/636: Performed by: NURSE ANESTHETIST, CERTIFIED REGISTERED

## 2018-07-25 PROCEDURE — 74011000250 HC RX REV CODE- 250

## 2018-07-25 PROCEDURE — 75410000003 HC RECOV DEL/VAG/CSECN EA 0.5 HR

## 2018-07-25 RX ORDER — IBUPROFEN 400 MG/1
800 TABLET ORAL
Status: DISCONTINUED | OUTPATIENT
Start: 2018-07-25 | End: 2018-07-27 | Stop reason: HOSPADM

## 2018-07-25 RX ORDER — NALOXONE HYDROCHLORIDE 0.4 MG/ML
0.2 INJECTION, SOLUTION INTRAMUSCULAR; INTRAVENOUS; SUBCUTANEOUS AS NEEDED
Status: DISCONTINUED | OUTPATIENT
Start: 2018-07-25 | End: 2018-07-25

## 2018-07-25 RX ORDER — BUPIVACAINE HYDROCHLORIDE 2.5 MG/ML
INJECTION, SOLUTION EPIDURAL; INFILTRATION; INTRACAUDAL AS NEEDED
Status: DISCONTINUED | OUTPATIENT
Start: 2018-07-25 | End: 2018-07-25 | Stop reason: HOSPADM

## 2018-07-25 RX ORDER — DIPHENHYDRAMINE HYDROCHLORIDE 50 MG/ML
25 INJECTION, SOLUTION INTRAMUSCULAR; INTRAVENOUS
Status: DISCONTINUED | OUTPATIENT
Start: 2018-07-25 | End: 2018-07-25

## 2018-07-25 RX ORDER — AMOXICILLIN 250 MG
1 CAPSULE ORAL 2 TIMES DAILY
Status: DISCONTINUED | OUTPATIENT
Start: 2018-07-25 | End: 2018-07-27 | Stop reason: HOSPADM

## 2018-07-25 RX ORDER — EPHEDRINE SULFATE 50 MG/ML
10 INJECTION, SOLUTION INTRAVENOUS AS NEEDED
Status: DISCONTINUED | OUTPATIENT
Start: 2018-07-25 | End: 2018-07-25

## 2018-07-25 RX ORDER — SALICYLIC ACID
POWDER (GRAM) MISCELLANEOUS
Status: COMPLETED
Start: 2018-07-25 | End: 2018-07-25

## 2018-07-25 RX ORDER — ACETAMINOPHEN 325 MG/1
650 TABLET ORAL
Status: DISCONTINUED | OUTPATIENT
Start: 2018-07-25 | End: 2018-07-27 | Stop reason: HOSPADM

## 2018-07-25 RX ORDER — METOCLOPRAMIDE HYDROCHLORIDE 5 MG/ML
10 INJECTION INTRAMUSCULAR; INTRAVENOUS
Status: DISCONTINUED | OUTPATIENT
Start: 2018-07-25 | End: 2018-07-25

## 2018-07-25 RX ORDER — NALBUPHINE HYDROCHLORIDE 10 MG/ML
2.5 INJECTION, SOLUTION INTRAMUSCULAR; INTRAVENOUS; SUBCUTANEOUS
Status: DISCONTINUED | OUTPATIENT
Start: 2018-07-25 | End: 2018-07-25

## 2018-07-25 RX ORDER — LIDOCAINE HYDROCHLORIDE AND EPINEPHRINE 15; 5 MG/ML; UG/ML
INJECTION, SOLUTION EPIDURAL AS NEEDED
Status: DISCONTINUED | OUTPATIENT
Start: 2018-07-25 | End: 2018-07-25 | Stop reason: HOSPADM

## 2018-07-25 RX ORDER — PHENYLEPHRINE HCL IN 0.9% NACL 0.4MG/10ML
100 SYRINGE (ML) INTRAVENOUS AS NEEDED
Status: DISCONTINUED | OUTPATIENT
Start: 2018-07-25 | End: 2018-07-25

## 2018-07-25 RX ORDER — PROMETHAZINE HYDROCHLORIDE 25 MG/ML
25 INJECTION, SOLUTION INTRAMUSCULAR; INTRAVENOUS
Status: DISCONTINUED | OUTPATIENT
Start: 2018-07-25 | End: 2018-07-27 | Stop reason: HOSPADM

## 2018-07-25 RX ADMIN — LIDOCAINE HYDROCHLORIDE AND EPINEPHRINE 3 ML: 15; 5 INJECTION, SOLUTION EPIDURAL at 07:49

## 2018-07-25 RX ADMIN — SODIUM CHLORIDE, SODIUM LACTATE, POTASSIUM CHLORIDE, AND CALCIUM CHLORIDE 1000 ML: 600; 310; 30; 20 INJECTION, SOLUTION INTRAVENOUS at 10:41

## 2018-07-25 RX ADMIN — BUPIVACAINE HYDROCHLORIDE 2 ML: 2.5 INJECTION, SOLUTION EPIDURAL; INFILTRATION; INTRACAUDAL at 07:56

## 2018-07-25 RX ADMIN — IBUPROFEN 800 MG: 400 TABLET ORAL at 22:21

## 2018-07-25 RX ADMIN — Medication 12 MILLI-UNITS/MIN: at 13:17

## 2018-07-25 RX ADMIN — Medication 10 ML/HR: at 07:59

## 2018-07-25 RX ADMIN — Medication 2 MILLI-UNITS/MIN: at 10:41

## 2018-07-25 RX ADMIN — BUPIVACAINE HYDROCHLORIDE 4 ML: 2.5 INJECTION, SOLUTION EPIDURAL; INFILTRATION; INTRACAUDAL at 07:51

## 2018-07-25 RX ADMIN — NALBUPHINE HYDROCHLORIDE 10 MG: 10 INJECTION, SOLUTION INTRAMUSCULAR; INTRAVENOUS; SUBCUTANEOUS at 01:38

## 2018-07-25 RX ADMIN — SODIUM CHLORIDE, SODIUM LACTATE, POTASSIUM CHLORIDE, AND CALCIUM CHLORIDE 1000 ML: 600; 310; 30; 20 INJECTION, SOLUTION INTRAVENOUS at 06:46

## 2018-07-25 RX ADMIN — Medication 6 MILLI-UNITS/MIN: at 11:32

## 2018-07-25 RX ADMIN — Medication 4 MILLI-UNITS/MIN: at 09:50

## 2018-07-25 RX ADMIN — CASTOR OIL: 1 LIQUID ORAL at 16:16

## 2018-07-25 RX ADMIN — Medication 4 MILLI-UNITS/MIN: at 11:01

## 2018-07-25 RX ADMIN — BUPIVACAINE HYDROCHLORIDE 4 ML: 2.5 INJECTION, SOLUTION EPIDURAL; INFILTRATION; INTRACAUDAL at 07:54

## 2018-07-25 RX ADMIN — Medication 8 MILLI-UNITS/MIN: at 12:04

## 2018-07-25 RX ADMIN — SODIUM CHLORIDE, SODIUM LACTATE, POTASSIUM CHLORIDE, AND CALCIUM CHLORIDE 125 ML/HR: 600; 310; 30; 20 INJECTION, SOLUTION INTRAVENOUS at 08:29

## 2018-07-25 RX ADMIN — Medication 10 MILLI-UNITS/MIN: at 12:46

## 2018-07-25 RX ADMIN — SODIUM CHLORIDE, SODIUM LACTATE, POTASSIUM CHLORIDE, AND CALCIUM CHLORIDE 125 ML/HR: 600; 310; 30; 20 INJECTION, SOLUTION INTRAVENOUS at 10:42

## 2018-07-25 NOTE — PROGRESS NOTES
Labor Progress Note  Patient seen, fetal heart rate and contraction pattern evaluated, patient examined. Patient Vitals for the past 1 hrs:   BP Pulse   07/25/18 0140 128/71 84     Lab Results  Component Value Date/Time   WBC 12.6 07/24/2018 09:55 PM   HGB 11.8 (L) 07/24/2018 09:55 PM   HCT 33.5 (L) 07/24/2018 09:55 PM   PLATELET 134 45/21/4835 09:55 PM   MCV 92.8 07/24/2018 09:55 PM       Physical Exam:  Cervical Exam:  5 cm dilated    90% effaced    -1 station    Membranes:  Spontaneous Rupture of Membranes; Amniotic Fluid: clear fluid  Uterine Activity: Frequency: Every 2-3 minutes  Fetal Heart Rate: Baseline: 140 per minute    Assessment/Plan:  No change in 2 hours. Asking for pain medication. Discussed hopeful will assist with dilation/ pelvic relaxation. Will recheck in 2 hours. Jessica Simons tells me after much questioning that she had pelvic \" break in bone\".

## 2018-07-25 NOTE — PROGRESS NOTES
Labor Progress Note    Patient seen, fetal heart rate and contraction pattern evaluated, patient examined. Physical Exam:  Cervical Exam:  8-9 cm dilated    100% effaced    -1 station    Presenting Part: cephalic, caput present  Membranes:  Spontaneous Rupture of Membranes; Amniotic Fluid: clear fluid  Uterine Activity: Frequency: Every 2-3 minutes and Duration: 60-80 seconds  Fetal Heart Rate: Reactive    Assessment/Plan:  Reassuring fetal status. Slowed progress in active labor in spite of frequent maternal position changes to facilitate fetal descent. Discussed epidural for maternal rest and IUPC, resume pitocin as indicated. Plan to re-evaluate labor progression after epidural placement and adequate contraction pattern has been achieved. Discussed r/b/a, pt and spouse are in agreement with this plan.

## 2018-07-25 NOTE — PROGRESS NOTES
Turned to right side. Light meconium-stained fluid noted at last position change, continues to be present. Pads changed.

## 2018-07-25 NOTE — PROGRESS NOTES
Labor Progress Note  Patient seen, fetal heart rate and contraction pattern evaluated, patient examined. Feeling occasional pressure  Patient Vitals for the past 1 hrs:   Temp   07/25/18 1204 99.1 °F (37.3 °C)       Physical Exam:  Cervical Exam:  9 cm dilated    90% effaced    -1 station    Membranes:  leaking light meconium fluid  Uterine Activity: Frequency: Every 1-4 minutes, Duration: 60-80 seconds and Intensity:baseline 10 mmHg, apex 25-75 mmHg  Fetal Heart Rate: Baseline: 150 per minute  Variability: moderate  Accelerations: yes  Decelerations: variable and late    Assessment: IUP 40w1d; labor progress; FHT Cat 2; PROM  Plan: Pt has made small amount of progress. Somewhat difficult to maintain adequate contraction pattern. FHR with occasional decelerations, but responds well to repositioning. Will re-assess in one hour.  Pt aware route of delivery guarded

## 2018-07-25 NOTE — PROGRESS NOTES
Apple Jacques MD  [facility]  [address]  [phone]                 Labor progress note:       Here to evaluate labor progress. Estimated Gestational Age: 44w3d       Historical Additional  Problem List.: Problem List as of 2018  Date Reviewed: 2018          Codes Class Noted - Resolved    * (Principal)Labor and delivery indication for care or intervention ICD-10-CM: O75.9  ICD-9-CM: 659.90  2018 - Present        Advanced maternal age, primigravida ICD-10-CM: O12.26  ICD-9-CM: 659.50  2018 - Present        Herpes labialis without complication Bahai-46-HZ: B66.8  ICD-9-CM: 054.9  2018 - Present        Hypothyroidism in pregnancy ICD-10-CM: O99.280, E03.9  ICD-9-CM: 648.10, 244.9  2018 - Present        Labor and delivery, indication for care ICD-10-CM: O75.9  ICD-9-CM: 659.90  2018 - Present                  Previous pelvic exam:  CervicalExam:9 (9)/100 %/0/       Cervical Exam  Dilation (cm): 9 (9)  Eff: 100 %  Station: 0  Vaginal exam done by? : Amarjit Diaz           Baseline FHR:  Patient Vitals for the past 2 hrs:    Mode Fetal Heart Rate Fetal Activity Variability Decelerations Accelerations FHR Interpretation   18 1330 Internal 150 Present 6-25 BPM (!) Late Yes Category II         Patient Vitals for the past 2 hrs:   Temp Pulse SpO2   18 1331 99.2 °F (37.3 °C) - -   18 1302 - 85 100 %           Temp (24hrs), Av °F (36.7 °C), Min:97.1 °F (36.2 °C), Max:99.2 °F (37.3 °C)      WBC   Date/Time Value Ref Range Status   2018 09:55 PM 12.6 4.6 - 13.2 K/uL Final   2018 01:45 PM 10.7 4.6 - 13.2 K/uL Final     HGB   Date/Time Value Ref Range Status   2018 09:55 PM 11.8 (L) 12.0 - 16.0 g/dL Final   2018 01:45 PM 12.4 12.0 - 16.0 g/dL Final     PLATELET   Date/Time Value Ref Range Status   2018 09:55  135 - 420 K/uL Final              Plan:    Now made some progress but FHR still a concern and watching closely,  Unless very low operative vaginal delivery would not be suggested but will continue to evaluate.             Reji Sampson MD

## 2018-07-25 NOTE — PROGRESS NOTES
Ruth Lopez MD  [address]  [address]  [address]  [phone]                 Labor progress note:       Here to evaluate labor progress. Estimated Gestational Age: 44w3d       Historical Additional  Problem List.: Problem List as of 2018  Date Reviewed: 2018          Codes Class Noted - Resolved    * (Principal)Labor and delivery indication for care or intervention ICD-10-CM: O75.9  ICD-9-CM: 659.90  2018 - Present        Advanced maternal age, primigravida ICD-10-CM: O12.26  ICD-9-CM: 659.50  2018 - Present        Herpes labialis without complication VUQ-17-UT: R39.2  ICD-9-CM: 054.9  2018 - Present        Hypothyroidism in pregnancy ICD-10-CM: O99.280, E03.9  ICD-9-CM: 648.10, 244.9  2018 - Present        Labor and delivery, indication for care ICD-10-CM: O75.9  ICD-9-CM: 659.90  2018 - Present                  Previous pelvic exam:  CervicalExam:9 (9)/100 %/0/       Cervical Exam  Dilation (cm): 9 (9)  Eff: 100 %  Station: 0  Vaginal exam done by? : Trisha Route           Baseline FHR:  Patient Vitals for the past 2 hrs:    Mode Fetal Heart Rate Fetal Activity Variability Decelerations Accelerations FHR Interpretation   18 1330 Internal 150 Present 6-25 BPM (!) Late Yes Category II   18 1300 (P) Internal (P) 150 - (P) 6-25 BPM (!) (P) Late;Variable (P) Yes (P) Category II         Patient Vitals for the past 2 hrs:   BP Temp Pulse SpO2   18 1331 - 99.2 °F (37.3 °C) - -   18 1302 - - 85 100 %   18 1301 105/61 - - -           Temp (24hrs), Av °F (36.7 °C), Min:97.1 °F (36.2 °C), Max:99.2 °F (37.3 °C)      WBC   Date/Time Value Ref Range Status   2018 09:55 PM 12.6 4.6 - 13.2 K/uL Final   2018 01:45 PM 10.7 4.6 - 13.2 K/uL Final     HGB   Date/Time Value Ref Range Status   2018 09:55 PM 11.8 (L) 12.0 - 16.0 g/dL Final   2018 01:45 PM 12.4 12.0 - 16.0 g/dL Final PLATELET   Date/Time Value Ref Range Status   07/24/2018 09:55  135 - 420 K/uL Final          2  Plan:    Concerning tracing and very slow progress and will not wait too much longer, Marlen Morse and I are in close contact and watching the tracing closely           Ana Mccauley MD22

## 2018-07-25 NOTE — PROGRESS NOTES
0715-RECEIVED REPORT OF PT AT THIS TIME. PT REQUESTING EPIDURAL NOW. IVF BOLUS CONTINUED.     0733-LAURENT EM CRNA AT [facility] 60. SEE EPIDURAL FLOWSHEET       0815-RN REMAINS AT BEDSIDE. PT REPOSITIONED TO FAR R SIDE IN BED. IVF BOLUS CONTINUED. PITOCIN REMAINS OFF AT THIS TIME.     0815-need,purpose and risks of foster cath explained and placed now by edmond taylor rn via sterile tech and hospital policy. Pt tolerated procedure well. Clear yellow urine noted in foster. 0830-Pt laying in bed on far R side. No distress noted. 0847-Pt repositioned to far L side in bed. 0853-Carly CNM at bedside to review POC w/ pt now. Need, purpose and risks of IUPC and FSE explained to pt now. Pt verb understanding. IUPC and FSE placed via sterile tech by Stacie Bal w/o incident. Pt tolerated procedure well. 0910-Pt repositioned to chest to bed/trendelenburg/L side. Pitocin restart at 2 mu/min per provider orders. 0942-Fhr decel noted. Rn and CNM at bedside. Pt repositioned to knee/chest position. 0948-Orders received at bedside from Stacie Bal to increase pitocin to 4 mu/min now. 1020-Rn at bedside. reccurent late decels noted. Oxytocin off now. O2 on via fm. Pt repositioned to far L side in bed. Temp 97.8    1030-Carly Hallman CNNAUN called and report given on reccurent late decels and prolonged late decel-interventions taken-repositioned pt to far L side, pitocin off and O2 on via fm at 12l/min, IVF bolus. Report also given that pt is currently Afebrile but pt c/o \"feeling chills\". Orders received to restart pitocin in 15 min. Orders received, repeated and confirmed. 1102-O2 off now. 1130-peanut ball in use between pt legs. Pt laying on far L side in bed.     1206-pt repositioned to sitting     1220-Carly CNM at bedside, reviewed strip. Reviewed POC w/ pt at this time. 1233-Pt repositioned to far r side in bed.  O2 applied on    1328-l lateral    1330-r lateral, FHR decel noted. O2 remains on.        1328-Dr Sourav Osman on unit. Reviewed strip    1354-[facility] CNM called and full report given on interventions. CNM and Dr Sourav Osman have reviewed strip. 1421-pt repositioned to sitting position in bed. 1530-IUPC removed intact. 1538- of viable infant male. See delivery summary. 1835-Pt assisted up to br to void. Pt voided 700 ml bloody urine. Pericare given w/ instructions. No distress noted. Pt ambulating w/o assistance.

## 2018-07-25 NOTE — PROGRESS NOTES
Assumed care of patient. SBAR report received from RN 64 [facility] which included MAR, Kardex and current plan of care. Bedside introductions given. White board updated. Pt reports pain level 5-6/10 with contractions. Denies any pain interventions at this time. Elevated pressures noted. Pt denies headache, blurred vision or any discomfort unrelated to contractions. 323 [facility] updated on elevated blood pressures. CNNAUN Pizarro to come assess patient. 9032 Oscar Del Cid at bedside. SVE. Artificial rupture of forebag. Clear fluid noted. Plan of care discussed with patient. Pt and spouse verbalize understanding. 2000 Pt helped into shower  2200Pitocin decreased from 6mu to 4 mu.  0200  tachysystole . pitocin turned off. CNNAUN Pizarro made aware. 0300 pt called out feeling rectal pressure. SVE . Pericare given DARRELL Pizarro Notified. 0500 CNM at bedside. SVE. Working with patient on positional changes and relaxation techniques. 9439 CNMLavin at bedside. Recommends Epidural placement and internal monitors. Pt agrees with plan of care. 0049 Anesthesia paged for Epidural placement. 0700 SBAR report given to MONICO Holm which included MAR, kardex and current plan of care. Relinquished care of patient.

## 2018-07-25 NOTE — PROGRESS NOTES
Angie Padilla MD  [address]  [address]  [address]  [facility]  [phone]                 Labor progress note:       Here to evaluate labor progress. Estimated Gestational Age: 44w3d       Historical Additional  Problem List.: Problem List as of 2018  Date Reviewed: 2018          Codes Class Noted - Resolved    * (Principal)Labor and delivery indication for care or intervention ICD-10-CM: O75.9  ICD-9-CM: 659.90  2018 - Present        Advanced maternal age, primigravida ICD-10-CM: O12.26  ICD-9-CM: 659.50  2018 - Present        Herpes labialis without complication JQZ-29-LL: A72.7  ICD-9-CM: 054.9  2018 - Present        Hypothyroidism in pregnancy ICD-10-CM: O99.280, E03.9  ICD-9-CM: 648.10, 244.9  2018 - Present        Labor and delivery, indication for care ICD-10-CM: O75.9  ICD-9-CM: 659.90  2018 - Present                  Previous pelvic exam:  CervicalExam:9 (9)/100 %/0/       Cervical Exam  Dilation (cm): 9 (9)  Eff: 100 %  Station: 0  Vaginal exam done by? : Allen Villarreal           Baseline FHR:  Patient Vitals for the past 2 hrs:    Mode Fetal Heart Rate Fetal Activity Variability Decelerations Accelerations FHR Interpretation   18 0800 External 140 Present 6-25 BPM None Yes Category I         Patient Vitals for the past 2 hrs:   BP Temp Pulse Resp SpO2   18 0841 - - - - 100 %   18 0839 118/64 - 88 - -   18 0836 - - - - 97 %   18 0835 - - - - 100 %   18 0830 - - - - 100 %   18 0825 - - - - 100 %   18 0820 - - - - 100 %   18 0815 - - - - 98 %   18 0810 - - - - 100 %   18 0805 - - - - 100 %   18 0800 - 97.1 °F (36.2 °C) - 18 100 %   18 0755 - - - - 100 %   18 0750 140/74 - 89 - 100 %   18 0745 - - - - 100 %   18 0744 152/86 - 96 - -           Temp (24hrs), Av.7 °F (36.5 °C), Min:97.1 °F (36.2 °C), Max:98.3 °F (36.8 °C)      WBC Date/Time Value Ref Range Status   07/24/2018 09:55 PM 12.6 4.6 - 13.2 K/uL Final   07/24/2018 01:45 PM 10.7 4.6 - 13.2 K/uL Final     HGB   Date/Time Value Ref Range Status   07/24/2018 09:55 PM 11.8 (L) 12.0 - 16.0 g/dL Final   07/24/2018 01:45 PM 12.4 12.0 - 16.0 g/dL Final     PLATELET   Date/Time Value Ref Range Status   07/24/2018 09:55  135 - 420 K/uL Final              Plan:    Discussed with CNMW and indeed labor poor and will augment, Hopefully this will solve problem of slow progress.             Stacey Infante MD

## 2018-07-25 NOTE — ANESTHESIA PREPROCEDURE EVALUATION
Anesthetic History   No history of anesthetic complications            Review of Systems / Medical History  Patient summary reviewed, nursing notes reviewed and pertinent labs reviewed    Pulmonary  Within defined limits                 Neuro/Psych   Within defined limits           Cardiovascular  Within defined limits                Exercise tolerance: >4 METS     GI/Hepatic/Renal  Within defined limits              Endo/Other      Hypothyroidism: well controlled       Other Findings              Physical Exam    Airway  Mallampati: II  TM Distance: 4 - 6 cm  Neck ROM: normal range of motion   Mouth opening: Normal     Cardiovascular    Rhythm: regular  Rate: normal         Dental  No notable dental hx       Pulmonary  Breath sounds clear to auscultation               Abdominal  GI exam deferred       Other Findings            Anesthetic Plan    ASA: 2  Anesthesia type: epidural            Anesthetic plan and risks discussed with: Patient and Spouse

## 2018-07-25 NOTE — L&D DELIVERY NOTE
Delivery Summary    Patient: Miky Lynne MRN: [id number]  SSN: [id number]    YOB: 1985  Age: 27 y.o. Sex: female         \"boy\"  Miky Lynne labored the fetal head to C/C/0 at which point pushing was initiated in [facility] position. Tedd Cheadle was a powerful pusher and moved the baby very well toward the introitus. She was motivated by visualization in the mirror. The fetal head emerged SURI, restituted to IONA and Tedd Cheadle was advised to continue pushing. Poor progress was made. Head of the bed lowered and hand placed in the posterior vagina. Shoulders felt to be in vertical position, Tedd Cheadle advised to continue pushing. Anterior shoulder easily released, but very slow for remainder of infant to emerged. When both shoulders were cleared, blue towel wrapped around baby to assist completion of birth. Infant to maternal abdomen with decreased tone and open eyes. Cord milked, single clamp and manual pressure applied for father to cut cord. Infant to warmer for attendance by pediatrician. Infant soon heard in full cry. Placenta delivered spontaneously with application of guarded fundal massage Sharlee Verma presentation. It was found to be intact with centrally-inserted 3vc. [email or web address] First degree perineal laceration repaired as noted.  Dr. Narinder Aranda given report    Labor Events:    Labor: No    Rupture Date:      Rupture Time:      Rupture Type PROM    Amniotic Fluid Volume:      Amniotic Fluid Description: Clear  None    Induction: None        Augmentation: Oxytocin    Labor Complications: None     Additional Complications:        Cervical Ripening:       None      Delivery Events:  Episiotomy: None    Laceration(s): First degree perineal      Repaired: None     Number of Repair Packets:      Suture Type and Size:         Estimated Blood Loss (ml): 250        Information for the patient's :  Mike Gonzalez [[id number]]     Delivery Summary - Baby    Delivery Date: 2018   Delivery Time: 3:38 PM   Delivery Type: Vaginal, Spontaneous Delivery  Sex:  male  Gestational Age: 44w3d  Delivery Clinician:  Malcolm Nathaniel  Living?: Living   Delivery Location: L&D 3418           APGARS  One minute Five minutes Ten minutes   Skin Color: 0    1       Heart Rate: 2   2         Reflex Irritability: 2   2         Muscle Tone: 2   2       Respiration: 1   2         Total: 7   9           Presentation: Vertex  Position: Right Occiput Anterior  Resuscitation Method:  PPV;Suctioning-bulb     Meconium Stained:  Thin    Cord Information: 3 Vessels   Complications: None  Cord Blood Sent?:  Yes    Blood Gases Sent?:  No    Placenta:  Date/Time:   3:40 PM  Removal: Expressed      Appearance: Normal;Intact     Vaughan Measurements:  Birth Weight: 3.9 kg    Birth Length: 57 cm   Head Circumference: 35 cm     Chest Circumference: 35 cm    Abdominal Girth: 33 cm    Other Providers:   CLIFTON MADRIGAL;FAROOQ BARNES;TIA TORRES;LAURENT EM L;CARROLL, KAREN A Primary Nurse;Primary Vaughan Nurse;Pediatrician;Crna;Midwife;Nursery Nurse

## 2018-07-25 NOTE — PROGRESS NOTES
Labor Progress Note  Patient seen, fetal heart rate and contraction pattern evaluated, patient examined. [address] labs reviewed and WNL. Patient Vitals for the past 1 hrs:   BP Temp Pulse   07/24/18 2337 - 97.7 °F (36.5 °C) -   07/24/18 2305 150/86 - 60       Physical Exam:  Cervical Exam:  5 cm dilated    100% effaced    -1 station    Membranes:  Spontaneous Rupture of Membranes; Amniotic Fluid: clear fluid  Uterine Activity: Frequency: Every 2-3 minutes and Duration: 60-80 seconds  Fetal Heart Rate: Reactive    Assessment: IUP 40w1d; active labor. Pitocin @4. Plan:  Anticipate vaginal delivery. Discussed IV pain medication, per pt request, comfort measures in labor.

## 2018-07-25 NOTE — PROGRESS NOTES
Labor Progress Note    Patient seen, fetal heart rate and contraction pattern evaluated, patient examined. Physical Exam:  Cervical Exam:  9 cm dilated    100% effaced    0 station    Presenting Part: cephalic  Membranes:  Spontaneous Rupture of Membranes; Amniotic Fluid: clear fluid. Bloody show present. Uterine Activity: Frequency: Every 2 minutes and Duration: 60 seconds  Fetal Heart Rate: Reactive    Assessment/Plan:  Reassuring fetal status, coping well in active labor, comfort measures supported. Pitocin stopped at 0300, adequate contraction pattern maintained. Anticipate vaginal delivery.

## 2018-07-25 NOTE — ANESTHESIA PROCEDURE NOTES
Epidural Block    Start time: 7/25/2018 7:35 AM  End time: 7/25/2018 7:58 AM  Performed by: PREMA Barcenas  Authorized by: Rigoberto Martinez     Pre-Procedure  Indication: labor epidural    Preanesthetic Checklist: patient identified, risks and benefits discussed, anesthesia consent, site marked, patient being monitored, timeout performed and anesthesia consent    Timeout Time: 07:41        Epidural:   Patient position:  Seated  Prep region:  Lumbar  Prep: Betadine    Location:  L3-4    Needle and Epidural Catheter:   Needle Type:  Tuohy  Needle Gauge:  18 G  Injection Technique:  Loss of resistance using saline  Attempts:  1  Catheter Size:  19 G  Catheter at Skin Depth (cm):  11  Depth in Epidural Space (cm):  5

## 2018-07-25 NOTE — PROGRESS NOTES
Labor Progress Note  Patient seen, fetal heart rate and contraction pattern evaluated, patient examined. Comfortable with epidural  Patient Vitals for the past 1 hrs:   BP Pulse SpO2   07/25/18 0841 - - 100 %   07/25/18 0839 118/64 88 -   07/25/18 0836 - - 97 %   07/25/18 0835 - - 100 %   07/25/18 0830 - - 100 %   07/25/18 0825 - - 100 %   07/25/18 0820 - - 100 %   07/25/18 0815 - - 98 %       Physical Exam:  Cervical Exam:  8 cm dilated    90% effaced    -2 station    Presenting Part: cephalic  Membranes:  leaking clear, non-foul fluid  Uterine Activity: infrequent, mild; IUPC has been placed  Fetal Heart Rate: Baseline: 150 per minute  Variability: moderate  Accelerations: yes  Decelerations: none    Assessment: IUP 40w1d; PROM; ineffective contraction pattern; FHT Cat 1  Plan: Internal monitors placed, restart Pitocin. Discussed positioning with pt. Addressed caput present.  Chapis Coy is coping well, but this was not at all her plan

## 2018-07-26 LAB
HCT VFR BLD AUTO: 27.2 % (ref 35–45)
HGB BLD-MCNC: 9.6 G/DL (ref 12–16)

## 2018-07-26 PROCEDURE — 85018 HEMOGLOBIN: CPT | Performed by: ADVANCED PRACTICE MIDWIFE

## 2018-07-26 PROCEDURE — 65270000029 HC RM PRIVATE

## 2018-07-26 PROCEDURE — 36415 COLL VENOUS BLD VENIPUNCTURE: CPT | Performed by: ADVANCED PRACTICE MIDWIFE

## 2018-07-26 PROCEDURE — 74011250637 HC RX REV CODE- 250/637: Performed by: ADVANCED PRACTICE MIDWIFE

## 2018-07-26 PROCEDURE — 85014 HEMATOCRIT: CPT | Performed by: ADVANCED PRACTICE MIDWIFE

## 2018-07-26 RX ADMIN — STANDARDIZED SENNA CONCENTRATE AND DOCUSATE SODIUM 1 TABLET: 8.6; 5 TABLET, FILM COATED ORAL at 08:50

## 2018-07-26 RX ADMIN — IBUPROFEN 800 MG: 400 TABLET ORAL at 06:36

## 2018-07-26 RX ADMIN — STANDARDIZED SENNA CONCENTRATE AND DOCUSATE SODIUM 1 TABLET: 8.6; 5 TABLET, FILM COATED ORAL at 18:02

## 2018-07-26 RX ADMIN — IBUPROFEN 800 MG: 400 TABLET ORAL at 15:40

## 2018-07-26 NOTE — PROGRESS NOTES
Progress Note    Patient: Xavi López MRN: [id number]     YOB: 1985  Age: 27 y.o. Subjective:     Postpartum Day: 1    The patient is feeling well. Pain is  well controlled with current medications. Urinary output is adequate. Baby is feeding via breast with difficulty secondary to  somnolence    Objective:      Patient Vitals for the past 12 hrs:   Temp Pulse Resp BP SpO2   18 0400 98.7 °F (37.1 °C) 87 18 119/71 100 %   18 2030 97.7 °F (36.5 °C) 94 18 122/79 99 %       General:    alert   Lochia:  appropriate   Uterine Fundus:   firm @ umbilicus    Perineum:  Intact    DVT Evaluation:  Negative Lesia's sign. Assessment:     Delivery: spontaneous vaginal delivery    Plan:     Doing well postpartum vaginal delivery. Ale Baker has been advised to feed baby more frequently as has now been 5 hours since the last feed. Baby is sleepy at the breast and will not latch. Discussed with nursing staff and who will advise pt and monitor baby's blood sugars prn. Continue current postpartum care. Encouraged hydration, nutrition and ambulation. Plan DC home tomorrow. Ale Baker really would like to go home today. Emphasized prolonged rupture, Johanna positive baby - baby to stay for 48 hours.      Signed By: Uday Castro CNM     2018

## 2018-07-26 NOTE — LACTATION NOTE
Mom states baby has nursed well, especially after delivery. Discussed nursing pattern/expectations - instructed to try at least every 3 hours. Discussed latch, colostrum, size of stomach, milk coming in, hindmilk, cluster feeding, wet/dirty diapers. Info sheet, daily log and resource list given and reviewed. Offered help with feedings. Encouraged to call with questions.

## 2018-07-26 NOTE — ROUTINE PROCESS
Bedside and Verbal shift change report given to Bryan Dodge RN   (oncoming nurse) by ADRY Prieto (offgoing nurse). Report included the following information Intake/Output, MAR and Recent Results. 1315 Patient requests to not be interrupted during quiet time. Call light in reach, HOB elevated, two side rails up, all needs met at this time. 1815 Patient doing well, up without complaints, Patient voiding well, Pain controled well with motrin, bonding well with baby, all needs met at this time.

## 2018-07-26 NOTE — PROGRESS NOTES
Assumed care of patient. SBAR report given By MONICO Jaimes which included, MAR , kardex and procedure summary. Bedside introductions given. White board updated and reviewed current plan of care. Baby bands checked and verified with family. Reviewed current plan of care Pt verbalized understanding. 2030 Assessment complete all findings WNL. Pt denies pain. Pt using icepacks and dermaplast. Family bonding well. 65 Breastfeeding infant. Motrin given. Will reassess. Family bonding well. Pt ambulating and voiding independently. 0100 Pt up and breastfeeding infant. Pt reports pain level is 1-2. Call bell within reach. 0330 Bands checked and verified. Taking baby to [facility] nursery for CBC. Pt verbalized understanding. 0400 Infant returned in crib from nursery bands checked and verified. Assisted with breastfeeding. Infant very sleepy. 0530 Second attempt at breastfeeding. Infant not interested. Very sleepy. Expressed colostrum . 0600 CNM at bedside. 0630 Pt refused synthroid. She said \"Dr. Reza Purdy says I dont have to take it after delivery because it might be resolved\"  0700 SBAR report given to oncoming shift which included MAR, kardex, procedure summary and current plan of care. Relinquished care of patient.

## 2018-07-26 NOTE — ANESTHESIA POSTPROCEDURE EVALUATION
Post-Anesthesia Evaluation and Assessment    Patient: Lani Apley MRN: [id number]  SSN: [id number]    YOB: 1985  Age: 27 y.o. Sex: female       Cardiovascular Function/Vital Signs  Visit Vitals    /75 (BP 1 Location: Left arm, BP Patient Position: Sitting)    Pulse 73    Temp 36.5 °C (97.7 °F)    Resp 16    SpO2 99%    Breastfeeding Unknown       Patient is status post No value filed. anesthesia for * No procedures listed *. Nausea/Vomiting: None    Postoperative hydration reviewed and adequate. Pain:  Pain Scale 1: Numeric (0 - 10) (07/26/18 0850)  Pain Intensity 1: 2 (07/26/18 0850)   Managed    Neurological Status:   Neuro (WDL): Within Defined Limits (07/25/18 1640)   At baseline    Mental Status and Level of Consciousness: Alert and oriented     Pulmonary Status:   O2 Device: Room air (07/26/18 0850)   Adequate oxygenation and airway patent    Complications related to anesthesia: None    Post-anesthesia assessment completed.  No concerns    Signed By: Mary Hermosillo CRNA     July 26, 2018

## 2018-07-27 VITALS
HEART RATE: 64 BPM | SYSTOLIC BLOOD PRESSURE: 121 MMHG | TEMPERATURE: 97.7 F | OXYGEN SATURATION: 100 % | RESPIRATION RATE: 17 BRPM | DIASTOLIC BLOOD PRESSURE: 80 MMHG

## 2018-07-27 PROCEDURE — 74011250637 HC RX REV CODE- 250/637: Performed by: ADVANCED PRACTICE MIDWIFE

## 2018-07-27 RX ORDER — IBUPROFEN 800 MG/1
800 TABLET ORAL
Qty: 60 TAB | Refills: 2 | Status: SHIPPED | OUTPATIENT
Start: 2018-07-27

## 2018-07-27 RX ADMIN — IBUPROFEN 800 MG: 400 TABLET ORAL at 00:54

## 2018-07-27 RX ADMIN — IBUPROFEN 800 MG: 400 TABLET ORAL at 10:40

## 2018-07-27 RX ADMIN — STANDARDIZED SENNA CONCENTRATE AND DOCUSATE SODIUM 1 TABLET: 8.6; 5 TABLET, FILM COATED ORAL at 08:30

## 2018-07-27 NOTE — PROGRESS NOTES
1945 Patient, partner, and family at bedside. Mother preparing to nurse. Denies any other needs at this time. 2035 Pads provided. Vital signs obtained, assessment completed. 2145 Patient denies any other needs at this time. 2305 Reviewed available medication; patient denies any other needs at this time, wishing to sleep.   0100 Patient sleeping, arouses easily to staff entry. Medication per [facility]  0553 Patient awake, finishing nursing session. Denies any needs at this time. Vital signs obtained. 0500 Patient sleeping, rouses easily to staff entry. 0630 Patient rouses easily to staff entry, denies any further needs at this time.

## 2018-07-27 NOTE — DISCHARGE INSTRUCTIONS

## 2018-07-27 NOTE — ROUTINE PROCESS
Bedside and Verbal shift change report given to UMESH Guzman rn (oncoming nurse) by TERRELL Del Toro rn (offgoing nurse). Report included the following information SBAR, Kardex, Intake/Output, MAR and Recent Results. Patient aware of hourly rounding and not waking patient if sleeping. 0900- Flu protocol dismissed. It is not flu season. 1040- Discharge instructions reviewed with patient. Understanding verbalized of all instructions given. Time given for questions, all questions answered. Electronic signature obtained. 1150- taken to car in wheel chair in stable condition. Holding baby in car seat.

## 2018-07-27 NOTE — ROUTINE PROCESS
Bedside and Verbal shift change report given to Michael Nelsonsoraida Jacobo (oncoming nurse) by Nesotr Reyes RN (offgoing nurse). Report included the following information SBAR.

## 2018-07-27 NOTE — DISCHARGE SUMMARY
Obstetrical Discharge Summary       [facility]  [address]  [phone]        Patient Ge Dueñas,[id number],32 y.o.,1985    Postpartum Day: Information for the patient's :  Ingris Sharpe [[id number]]   3 days       Admit Date: 2018    Discharge Date: 2018     Admitting Physician: William Martinez MD     Admission Diagnoses: maternity  Labor and delivery indication for care or intervention  Labor and delivery, indication for care    Discharge Diagnoses: same as above      Additional Diagnoses:Present on Admission:   Hypothyroidism in pregnancy       Patient Active Problem List   Diagnosis Code    Hypothyroidism in pregnancy O99.280, E03.9    Postpartum care following vaginal delivery Z39.2    First degree perineal laceration O70.0         Procedure:        Baby link  Information for the patient's :  Ingris Sharpe [[id number]]     Delivery Type: Vaginal, Spontaneous Delivery   Delivery Date: 2018   Delivery Time: 3:38 PM     Birth Weight: 3.9 kg     Sex:  male  Delivery Clinician:  Raad Hampton   Gestational Age: Gestational Age: 44w3d    Presentation: Vertex   Position: Right  Occiput  Anterior     Apgars were 7  and 9      Resuscitation Method: PPV;Suctioning-bulb     Meconium Stained: Thin  Living Status: Living       Placenta Date/Time:   3:40 PM   Placenta Removal: Expressed   Placenta Appearance: Vertex [1]     Cord Information: 3 Vessels    Cord Events: None       Cord Blood Sent?:  Yes    Blood Gases Sent?:  No         Baby procedures:    Information for the patient's :  Ingris Sharpe [[id number]]          Feeding Method: Infant Feeding: Breastmilk    Immunizations: There is no immunization history for the selected administration types on file for this patient. Immunizations:   There is no immunization history for the selected administration types on file for this patient. Group Beta Strep: No results found for: GRBSEXT       WBC   Date/Time Value Ref Range Status   2018 09:55 PM 12.6 4.6 - 13.2 K/uL Final   2018 01:45 PM 10.7 4.6 - 13.2 K/uL Final     HGB   Date/Time Value Ref Range Status   2018 06:25 AM 9.6 (L) 12.0 - 16.0 g/dL Final   2018 09:55 PM 11.8 (L) 12.0 - 16.0 g/dL Final   2018 01:45 PM 12.4 12.0 - 16.0 g/dL Final     PLATELET   Date/Time Value Ref Range Status   2018 09:55  135 - 420 K/uL Final         Hospital Course: Unremarkable  Subjective:         Jose Coffey is doing and feeling well. Ambulating and voiding appropriately. Nursing well. Requesting to go home today. Objective:   Breasts Nontender and intact  Fundus firm and involuting  Lochia rubra, minimal  Legs minimal to no edema and without pain. Lab/Data Review:  Patient Vitals for the past 8 hrs:   BP Temp Pulse Resp   18 0300 127/79 97.8 °F (36.6 °C) (!) 50 16       Temp (24hrs), Av.8 °F (36.6 °C), Min:97.7 °F (36.5 °C), Max:98 °F (36.7 °C)      WBC   Date/Time Value Ref Range Status   2018 09:55 PM 12.6 4.6 - 13.2 K/uL Final   2018 01:45 PM 10.7 4.6 - 13.2 K/uL Final     HGB   Date/Time Value Ref Range Status   2018 06:25 AM 9.6 (L) 12.0 - 16.0 g/dL Final   2018 09:55 PM 11.8 (L) 12.0 - 16.0 g/dL Final   2018 01:45 PM 12.4 12.0 - 16.0 g/dL Final     PLATELET   Date/Time Value Ref Range Status   2018 09:55  135 - 420 K/uL Final     Patient Instructions:   Current Discharge Medication List      START taking these medications    Details   ibuprofen (MOTRIN) 800 mg tablet Take 1 Tab by mouth every eight (8) hours as needed. Qty: 60 Tab, Refills: 2         CONTINUE these medications which have NOT CHANGED    Details   levothyroxine (SYNTHROID) 88 mcg tablet Take 88 mcg by mouth Daily (before breakfast).              Assessment:     Status post: postpartum vaginal delivery   Recovering well  Breastfeeding  Mood Stable  Hypothyroid       Plan:     Postpartum care discussed including diet, ambulation,actvitiy restrictions, and mood fluctuations. Discharge instructions and questions answered for vaginal delivery. Continue Synthroid.    Follow in 2 wks and then 6 wks or prn      Signed By: Lakesha Waters CNM     July 27, 2018

## 2018-07-27 NOTE — LACTATION NOTE
Mom states baby is nursing well, \"not as often as I expected\". Baby is not yet 50 hours old, reviewed nursing pattern, wet/dirty diapers. Encouraged to call with questions.

## 2018-12-04 ENCOUNTER — HOSPITAL ENCOUNTER (OUTPATIENT)
Dept: LAB | Age: 33
Discharge: HOME | End: 2018-12-04
Payer: OTHER GOVERNMENT

## 2018-12-04 PROCEDURE — 88175 CYTOPATH C/V AUTO FLUID REDO: CPT

## 2018-12-04 PROCEDURE — 87624 HPV HI-RISK TYP POOLED RSLT: CPT

## 2022-10-04 ENCOUNTER — HOSPITAL ENCOUNTER (OUTPATIENT)
Dept: LAB | Age: 37
Discharge: HOME | End: 2022-10-04
Payer: OTHER GOVERNMENT

## 2022-10-04 PROCEDURE — 88175 CYTOPATH C/V AUTO FLUID REDO: CPT

## 2022-10-04 PROCEDURE — 87624 HPV HI-RISK TYP POOLED RSLT: CPT
